# Patient Record
Sex: FEMALE | Race: WHITE | NOT HISPANIC OR LATINO
[De-identification: names, ages, dates, MRNs, and addresses within clinical notes are randomized per-mention and may not be internally consistent; named-entity substitution may affect disease eponyms.]

---
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
## 2019-07-16 ENCOUNTER — APPOINTMENT (OUTPATIENT)
Dept: OBGYN | Facility: CLINIC | Age: 19
End: 2019-07-16
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
## 2019-12-19 ENCOUNTER — APPOINTMENT (OUTPATIENT)
Dept: OBGYN | Facility: CLINIC | Age: 19
End: 2019-12-19
Payer: MEDICAID

## 2019-12-19 VITALS
WEIGHT: 121 LBS | SYSTOLIC BLOOD PRESSURE: 110 MMHG | HEIGHT: 64 IN | DIASTOLIC BLOOD PRESSURE: 73 MMHG | HEART RATE: 77 BPM | BODY MASS INDEX: 20.66 KG/M2

## 2019-12-19 DIAGNOSIS — N94.6 DYSMENORRHEA, UNSPECIFIED: ICD-10-CM

## 2019-12-19 PROCEDURE — 99395 PREV VISIT EST AGE 18-39: CPT

## 2019-12-19 RX ORDER — NORGESTIMATE AND ETHINYL ESTRADIOL 7DAYSX3 28
0.18/0.215/0.25 KIT ORAL DAILY
Qty: 3 | Refills: 0 | Status: ACTIVE | COMMUNITY
Start: 2018-04-23 | End: 1900-01-01

## 2019-12-19 NOTE — PHYSICAL EXAM
[Awake] : awake [Alert] : alert [Acute Distress] : no acute distress [Thyroid Nodule] : no thyroid nodule [LAD] : no lymphadenopathy [Goiter] : no goiter [Mass] : no breast mass [Nipple Discharge] : no nipple discharge [Axillary LAD] : no axillary lymphadenopathy [Soft] : soft [Distended] : not distended [Tender] : non tender [Oriented x3] : oriented to person, place, and time [H/Smegaly] : no hepatosplenomegaly [Depressed Mood] : not depressed [Flat Affect] : affect not flat [Labia Majora] : labia major [Labia Minora] : labia minora [Normal] : clitoris [No Bleeding] : there was no active vaginal bleeding [Pap Obtained] : a Pap smear was performed [Uterine Adnexae] : were not tender and not enlarged

## 2019-12-21 LAB
C TRACH RRNA SPEC QL NAA+PROBE: NOT DETECTED
N GONORRHOEA RRNA SPEC QL NAA+PROBE: NOT DETECTED
SOURCE AMPLIFICATION: NORMAL

## 2019-12-30 ENCOUNTER — RX RENEWAL (OUTPATIENT)
Age: 19
End: 2019-12-30

## 2020-12-17 ENCOUNTER — RX RENEWAL (OUTPATIENT)
Age: 20
End: 2020-12-17

## 2021-02-12 ENCOUNTER — APPOINTMENT (OUTPATIENT)
Dept: OBGYN | Facility: CLINIC | Age: 21
End: 2021-02-12
Payer: MEDICAID

## 2021-02-12 VITALS
SYSTOLIC BLOOD PRESSURE: 111 MMHG | WEIGHT: 119 LBS | HEIGHT: 64 IN | DIASTOLIC BLOOD PRESSURE: 75 MMHG | BODY MASS INDEX: 20.32 KG/M2

## 2021-02-12 DIAGNOSIS — Z01.419 ENCOUNTER FOR GYNECOLOGICAL EXAMINATION (GENERAL) (ROUTINE) W/OUT ABNORMAL FINDINGS: ICD-10-CM

## 2021-02-12 PROCEDURE — 99072 ADDL SUPL MATRL&STAF TM PHE: CPT

## 2021-02-12 PROCEDURE — 99395 PREV VISIT EST AGE 18-39: CPT

## 2021-02-12 RX ORDER — NORGESTIMATE AND ETHINYL ESTRADIOL 7DAYSX3 28
0.18/0.215/0.25 KIT ORAL
Qty: 84 | Refills: 3 | Status: ACTIVE | COMMUNITY
Start: 2019-12-30 | End: 1900-01-01

## 2021-02-12 NOTE — DISCUSSION/SUMMARY
[FreeTextEntry1] : Well woman visit\par \par pap done\par std cultures done\par OCP renewed-[facility], pt aware of dvt and emboli risks from ocp\par rt in 1 year\par

## 2021-02-12 NOTE — HISTORY OF PRESENT ILLNESS
[FreeTextEntry1] : 21 yo here for av. She has no gyn complaints today. She takes tri femynor and is doing well on it, no side effects.\par She denies a family h/o gyn or colon cancers.

## 2021-02-15 LAB
C TRACH RRNA SPEC QL NAA+PROBE: NOT DETECTED
N GONORRHOEA RRNA SPEC QL NAA+PROBE: NOT DETECTED
SOURCE TP AMPLIFICATION: NORMAL

## 2021-02-23 LAB — CYTOLOGY CVX/VAG DOC THIN PREP: ABNORMAL

## 2021-07-14 ENCOUNTER — EMERGENCY (EMERGENCY)
Facility: HOSPITAL | Age: 21
LOS: 1 days | Discharge: HOME | End: 2021-07-14
Attending: STUDENT IN AN ORGANIZED HEALTH CARE EDUCATION/TRAINING PROGRAM
Payer: MEDICAID

## 2021-07-14 VITALS
DIASTOLIC BLOOD PRESSURE: 77 MMHG | HEIGHT: 64 IN | HEART RATE: 85 BPM | TEMPERATURE: 98 F | WEIGHT: 119.93 LBS | RESPIRATION RATE: 18 BRPM | SYSTOLIC BLOOD PRESSURE: 118 MMHG | OXYGEN SATURATION: 98 %

## 2021-07-14 LAB
ALBUMIN SERPL ELPH-MCNC: 3.8 G/DL — SIGNIFICANT CHANGE UP (ref 3.3–5.2)
ALP SERPL-CCNC: 72 U/L — SIGNIFICANT CHANGE UP (ref 40–120)
ALT FLD-CCNC: 12 U/L — SIGNIFICANT CHANGE UP
ANION GAP SERPL CALC-SCNC: 11 MMOL/L — SIGNIFICANT CHANGE UP (ref 5–17)
AST SERPL-CCNC: 18 U/L — SIGNIFICANT CHANGE UP
BASOPHILS # BLD AUTO: 0.03 K/UL — SIGNIFICANT CHANGE UP (ref 0–0.2)
BASOPHILS NFR BLD AUTO: 0.5 % — SIGNIFICANT CHANGE UP (ref 0–2)
BILIRUB SERPL-MCNC: 0.5 MG/DL — SIGNIFICANT CHANGE UP (ref 0.4–2)
BUN SERPL-MCNC: 8.1 MG/DL — SIGNIFICANT CHANGE UP (ref 8–20)
CALCIUM SERPL-MCNC: 9.4 MG/DL — SIGNIFICANT CHANGE UP (ref 8.6–10.2)
CHLORIDE SERPL-SCNC: 104 MMOL/L — SIGNIFICANT CHANGE UP (ref 98–107)
CO2 SERPL-SCNC: 24 MMOL/L — SIGNIFICANT CHANGE UP (ref 22–29)
CREAT SERPL-MCNC: 0.64 MG/DL — SIGNIFICANT CHANGE UP (ref 0.5–1.3)
EOSINOPHIL # BLD AUTO: 0.08 K/UL — SIGNIFICANT CHANGE UP (ref 0–0.5)
EOSINOPHIL NFR BLD AUTO: 1.4 % — SIGNIFICANT CHANGE UP (ref 0–6)
GLUCOSE SERPL-MCNC: 72 MG/DL — SIGNIFICANT CHANGE UP (ref 70–99)
HCG SERPL-ACNC: <4 MIU/ML — SIGNIFICANT CHANGE UP
HCT VFR BLD CALC: 39.7 % — SIGNIFICANT CHANGE UP (ref 34.5–45)
HGB BLD-MCNC: 12.9 G/DL — SIGNIFICANT CHANGE UP (ref 11.5–15.5)
IMM GRANULOCYTES NFR BLD AUTO: 0.3 % — SIGNIFICANT CHANGE UP (ref 0–1.5)
LYMPHOCYTES # BLD AUTO: 1.89 K/UL — SIGNIFICANT CHANGE UP (ref 1–3.3)
LYMPHOCYTES # BLD AUTO: 32.1 % — SIGNIFICANT CHANGE UP (ref 13–44)
MCHC RBC-ENTMCNC: 30.1 PG — SIGNIFICANT CHANGE UP (ref 27–34)
MCHC RBC-ENTMCNC: 32.5 GM/DL — SIGNIFICANT CHANGE UP (ref 32–36)
MCV RBC AUTO: 92.5 FL — SIGNIFICANT CHANGE UP (ref 80–100)
MONOCYTES # BLD AUTO: 0.37 K/UL — SIGNIFICANT CHANGE UP (ref 0–0.9)
MONOCYTES NFR BLD AUTO: 6.3 % — SIGNIFICANT CHANGE UP (ref 2–14)
NEUTROPHILS # BLD AUTO: 3.49 K/UL — SIGNIFICANT CHANGE UP (ref 1.8–7.4)
NEUTROPHILS NFR BLD AUTO: 59.4 % — SIGNIFICANT CHANGE UP (ref 43–77)
PLATELET # BLD AUTO: 227 K/UL — SIGNIFICANT CHANGE UP (ref 150–400)
POTASSIUM SERPL-MCNC: 3.9 MMOL/L — SIGNIFICANT CHANGE UP (ref 3.5–5.3)
POTASSIUM SERPL-SCNC: 3.9 MMOL/L — SIGNIFICANT CHANGE UP (ref 3.5–5.3)
PROT SERPL-MCNC: 6.6 G/DL — SIGNIFICANT CHANGE UP (ref 6.6–8.7)
RBC # BLD: 4.29 M/UL — SIGNIFICANT CHANGE UP (ref 3.8–5.2)
RBC # FLD: 12.3 % — SIGNIFICANT CHANGE UP (ref 10.3–14.5)
SODIUM SERPL-SCNC: 139 MMOL/L — SIGNIFICANT CHANGE UP (ref 135–145)
TROPONIN T SERPL-MCNC: <0.01 NG/ML — SIGNIFICANT CHANGE UP (ref 0–0.06)
WBC # BLD: 5.88 K/UL — SIGNIFICANT CHANGE UP (ref 3.8–10.5)
WBC # FLD AUTO: 5.88 K/UL — SIGNIFICANT CHANGE UP (ref 3.8–10.5)

## 2021-07-14 PROCEDURE — 84702 CHORIONIC GONADOTROPIN TEST: CPT

## 2021-07-14 PROCEDURE — 99283 EMERGENCY DEPT VISIT LOW MDM: CPT | Mod: 25

## 2021-07-14 PROCEDURE — 93010 ELECTROCARDIOGRAM REPORT: CPT

## 2021-07-14 PROCEDURE — 36415 COLL VENOUS BLD VENIPUNCTURE: CPT

## 2021-07-14 PROCEDURE — 85025 COMPLETE CBC W/AUTO DIFF WBC: CPT

## 2021-07-14 PROCEDURE — 84484 ASSAY OF TROPONIN QUANT: CPT

## 2021-07-14 PROCEDURE — 93005 ELECTROCARDIOGRAM TRACING: CPT

## 2021-07-14 PROCEDURE — 71046 X-RAY EXAM CHEST 2 VIEWS: CPT

## 2021-07-14 PROCEDURE — 71046 X-RAY EXAM CHEST 2 VIEWS: CPT | Mod: 26

## 2021-07-14 PROCEDURE — 99285 EMERGENCY DEPT VISIT HI MDM: CPT

## 2021-07-14 PROCEDURE — 80053 COMPREHEN METABOLIC PANEL: CPT

## 2021-07-14 RX ORDER — LIDOCAINE 4 G/100G
1 CREAM TOPICAL ONCE
Refills: 0 | Status: COMPLETED | OUTPATIENT
Start: 2021-07-14 | End: 2021-07-14

## 2021-07-14 RX ORDER — ACETAMINOPHEN 500 MG
650 TABLET ORAL ONCE
Refills: 0 | Status: COMPLETED | OUTPATIENT
Start: 2021-07-14 | End: 2021-07-14

## 2021-07-14 RX ADMIN — Medication 650 MILLIGRAM(S): at 10:55

## 2021-07-14 RX ADMIN — Medication 650 MILLIGRAM(S): at 10:12

## 2021-07-14 RX ADMIN — LIDOCAINE 1 PATCH: 4 CREAM TOPICAL at 10:12

## 2021-07-14 NOTE — ED STATDOCS - NSFOLLOWUPINSTRUCTIONS_ED_ALL_ED_FT
please call and follow up with primary care within 24 - 48 hours and bring the result     Chest Pain    Chest pain can be caused by many different conditions which may or may not be dangerous. Causes include heartburn, lung infections, heart attack, blood clot in lungs, skin infections, strain or damage to muscle, cartilage, or bones, etc. In addition to a history and physical examination, an electrocardiogram (ECG) or other lab tests may have been performed to determine the cause of your chest pain. Follow up with your primary care provider or with a cardiologist as instructed.     SEEK IMMEDIATE MEDICAL CARE IF YOU HAVE ANY OF THE FOLLOWING SYMPTOMS: worsening chest pain, coughing up blood, unexplained back/neck/jaw pain, severe abdominal pain, dizziness or lightheadedness, fainting, shortness of breath, sweaty or clammy skin, vomiting, or racing heart beat. These symptoms may represent a serious problem that is an emergency. Do not wait to see if the symptoms will go away. Get medical help right away. Call 911 and do not drive yourself to the hospital.

## 2021-07-14 NOTE — ED STATDOCS - PROGRESS NOTE DETAILS
pt is seen by Dr york initially agreed with hx , PE and plan   seen the pt at the bed side  c.o right side of the chest wall pain get worse by take deep breathing   explained finding . she has pcp will f.u   pt state she is prescribed naproxen 500mg for previously recommended to cont

## 2021-07-14 NOTE — ED STATDOCS - ATTENDING CONTRIBUTION TO CARE
I, Lakia Coffey, performed a face to face bedside interview with this patient regarding history of present illness, review of symptoms and relevant past medical, social and family history.  I completed an independent physical examination. Medical decision making, follow-up on ordered tests (ie labs, radiologic studies) and re-evaluation of the patient's status has been communicated to the ACP.  Disposition of the patient will be based on test outcome and response to ED interventions.

## 2021-07-14 NOTE — ED STATDOCS - OBJECTIVE STATEMENT
21 y/o female no significant PMHx c/o 3 days of left side chest wall pain worse on deep breath. Pt denies trauma. Pt went to [facility] MD had x-ray that was normal per pt and a negative d-dimer. No hx of DVT or PE. Recent covid-19 vax with Moderna in May

## 2021-07-14 NOTE — ED ADULT NURSE NOTE - OBJECTIVE STATEMENT
Patient c/o left ribs pain with sudden onset on monday afternoon, was seen in urgent care with results negative.

## 2021-07-14 NOTE — ED STATDOCS - PATIENT PORTAL LINK FT
You can access the FollowMyHealth Patient Portal offered by [facility] by registering at the following website: [email or web address]. By joining [employer]’s FollowMyHealth portal, you will also be able to view your health information using other applications (apps) compatible with our system.

## 2021-07-14 NOTE — ED ADULT TRIAGE NOTE - CHIEF COMPLAINT QUOTE
Pt c/o pain to left ribcage x 2 days, had xray and blood work done Monday night which was negative, prescribed naproxen, taken w/out relief, denies injury/trauma to area

## 2021-07-14 NOTE — ED STATDOCS - CLINICAL SUMMARY MEDICAL DECISION MAKING FREE TEXT BOX
pt reports neg d-dimer outpatient. Will get EKG, chest x-ray low risk for ACS. pain control, reassess

## 2022-02-01 ENCOUNTER — APPOINTMENT (OUTPATIENT)
Dept: ORTHOPEDIC SURGERY | Facility: CLINIC | Age: 22
End: 2022-02-01
Payer: MEDICAID

## 2022-02-01 VITALS
HEART RATE: 67 BPM | WEIGHT: 113 LBS | DIASTOLIC BLOOD PRESSURE: 74 MMHG | SYSTOLIC BLOOD PRESSURE: 117 MMHG | TEMPERATURE: 97.1 F | BODY MASS INDEX: 19.29 KG/M2 | HEIGHT: 64 IN

## 2022-02-01 PROCEDURE — 99203 OFFICE O/P NEW LOW 30 MIN: CPT

## 2022-02-01 PROCEDURE — 73110 X-RAY EXAM OF WRIST: CPT | Mod: LT

## 2022-02-01 RX ORDER — MELOXICAM 15 MG/1
15 TABLET ORAL
Qty: 30 | Refills: 2 | Status: ACTIVE | COMMUNITY
Start: 2022-02-01 | End: 1900-01-01

## 2022-02-01 NOTE — ASSESSMENT
[FreeTextEntry1] : Patient with left wrist pain consistent with ECU tendinitis. The nature of this condition was described at length with the patient she verbalized understanding. I recommend an extended period of rest and immobilization with activities. A note was given to the patient to bring with her to work in order to be able to rest and not do any lifting. Gentle wrist range of motion/stretching exercises were demonstrated to the patient today. I recommend twice daily exercises to prevent stiffness. Prescription for meloxicam was sent for the patient as well. The patient will followup in 2 weeks for reevaluation if she is not seeing improvement. Otherwise she will followup as needed. The patient is in agreement with this plan and appreciative of her care.

## 2022-02-01 NOTE — PHYSICAL EXAM
[Normal Finger/nose] : finger to nose coordination [Normal] : no peripheral adenopathy appreciated [de-identified] : Left Wrist Exam\par \par Skin: Intact with no erythema, no ecchymosis\par Exam: No foveal TTP, no SL TTP, no TTP of snuffbox, distal radius or distal ulna. Negative Thakkar's test, no DRUJ instability, no ECU subluxation but positive tenderness on palpation. Negative Grind Test. Negative Finkelstein's Test.\par ROM: 60 degrees of flexion, 60 degrees of extension. Full pronation and supination. Ulnar sided pain, specifically with supination.\par Neuro: Sensation is grossly intact without any deficits.  strength is 5/5. Negative Tinel's at the Wrist and Elbow. Negative Phalen's at the wrist.\par Vasc: Distal Pulses 2+ and capillary refill is brisk.\par  [de-identified] : VINICIUSR [de-identified] : 3 xray views of the left wrist were obtained.\par \par No fractures or dislocations are noted.

## 2022-02-01 NOTE — HISTORY OF PRESENT ILLNESS
[FreeTextEntry1] : 02/01/2022: Patient is a 21 year-old, right-hand-dominant female who presents to the office today for initial evaluation of left wrist pain. In the beginning of December, she was holding a heavy object with her wrist in full extension. She felt ulnar sided wrist pain because she felt that her wrist gave out on her. This sensation was associated with paresthesias that are transient and in the ulnar nerve distribution. Now she has sharp pain with lifting and dull and achy pain with rest. The pain is mostly located on the ulnar aspect of the wrist, just proximal to the ulnar fovea. She has tried Tylenol, Advil, and Motrin with little improvement in her symptoms. 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 She does note that ice does help a little bit. She has tried PRN bracing with few positive results.

## 2022-02-15 ENCOUNTER — APPOINTMENT (OUTPATIENT)
Dept: ORTHOPEDIC SURGERY | Facility: CLINIC | Age: 22
End: 2022-02-15
Payer: MEDICAID

## 2022-02-15 VITALS
BODY MASS INDEX: 19.29 KG/M2 | HEART RATE: 71 BPM | SYSTOLIC BLOOD PRESSURE: 108 MMHG | DIASTOLIC BLOOD PRESSURE: 66 MMHG | WEIGHT: 113 LBS | HEIGHT: 64 IN

## 2022-02-15 PROCEDURE — 99214 OFFICE O/P EST MOD 30 MIN: CPT | Mod: 25

## 2022-02-15 PROCEDURE — 20605 DRAIN/INJ JOINT/BURSA W/O US: CPT | Mod: LT

## 2022-02-16 ENCOUNTER — EMERGENCY (EMERGENCY)
Facility: HOSPITAL | Age: 22
LOS: 1 days | Discharge: HOME | End: 2022-02-16
Attending: EMERGENCY MEDICINE
Payer: COMMERCIAL

## 2022-02-16 ENCOUNTER — NON-APPOINTMENT (OUTPATIENT)
Age: 22
End: 2022-02-16

## 2022-02-16 VITALS
HEIGHT: 64 IN | DIASTOLIC BLOOD PRESSURE: 83 MMHG | OXYGEN SATURATION: 98 % | HEART RATE: 63 BPM | RESPIRATION RATE: 20 BRPM | SYSTOLIC BLOOD PRESSURE: 122 MMHG | TEMPERATURE: 98 F | WEIGHT: 113.1 LBS

## 2022-02-16 PROCEDURE — 73130 X-RAY EXAM OF HAND: CPT | Mod: 26,LT

## 2022-02-16 PROCEDURE — 73130 X-RAY EXAM OF HAND: CPT

## 2022-02-16 PROCEDURE — 99284 EMERGENCY DEPT VISIT MOD MDM: CPT | Mod: 25

## 2022-02-16 PROCEDURE — 73110 X-RAY EXAM OF WRIST: CPT

## 2022-02-16 PROCEDURE — 99283 EMERGENCY DEPT VISIT LOW MDM: CPT

## 2022-02-16 PROCEDURE — 73110 X-RAY EXAM OF WRIST: CPT | Mod: 26,LT

## 2022-02-16 NOTE — ED STATDOCS - NSFOLLOWUPINSTRUCTIONS_ED_ALL_ED_FT
Ulnar Nerve Contusion       The ulnar nerve extends from the shoulder to the small finger (pinkie) of the hand. This nerve provides feeling (sensation) to the pinkie and half of the ring finger. It also controls many hand and forearm muscles that let you  objects. An ulnar nerve contusion is a bruise of the ulnar nerve at a point close to the elbow.    An ulnar nerve contusion can cause a loss of feeling or movement in your hand. It can also affect your ability to use the muscles that allow you to  objects.      What are the causes?    This condition may be caused by:  •A hit to the elbow.      •Falling on your elbow.      •Shoving your elbow against a hard surface.        What increases the risk?    This condition is more likely to develop in people who:  •Play contact sports, like football.      •Have a disorder that increases the risk of bleeding.      •Take blood thinning medicine, such as warfarin.        What are the signs or symptoms?    Symptoms of this condition include:  •Tingling or numbness in the hand, especially in the pinkie or ring finger.      •Weakness while moving the hand from side to side in a waving motion or while moving your fingers together.      •Abnormal claw-like hand position or deformity.        How is this diagnosed?    This condition may be diagnosed based on your symptoms, your medical history, and a physical exam. You may have tests, such as:  •An X-ray to check for broken bones.      •An electromyogram (EMG) to see how well your nerves are working.      •A nerve conduction study (NCS) to see how electrical signals pass through your nerves.      •An MRI to find the source of any nerve problems.        How is this treated?    This condition usually heals on its own within 6 weeks. Treatment can help to reduce symptoms and may include:  •Wearing a brace or splint at night to keep your elbow straight while you sleep.      •Taking NSAIDs, such as ibuprofen, to reduce pain and swelling around the nerve.      •Working with a physical therapist to ease stiffness in your elbow and wrist.        Follow these instructions at home:    If you have a brace or splint:     •Wear it as told by your health care provider. Remove it only as told by your health care provider.      •Loosen it if your fingers tingle, become numb, or turn cold and blue.      •Keep it clean.    •If it is not waterproof:  •Do not let it get wet.      •Cover it with a watertight covering when you take a bath or shower.          Managing pain, stiffness, and swelling    •If directed, put ice on the injured area.  •If you have a removable brace or splint, remove it as told by your health care provider.      •Put ice in a plastic bag.      •Place a towel between your skin and the bag.      •Leave the ice on for 20 minutes, 2–3 times a day.        •Move your fingers often to reduce stiffness and swelling.      •Raise (elevate) the injured area above the level of your heart while you are sitting or lying down.      Activity     •Return to your normal activities as told by your health care provider. Ask your health care provider what activities are safe for you.      •Avoid activities that take a lot of effort (strenuous) for as long as told by your health care provider.      •Do exercises as told by your health care provider.      General instructions     • Do not use the injured limb to support your body weight until your health care provider says that you can.      • Do not use any products that contain nicotine or tobacco, such as cigarettes, e-cigarettes, and chewing tobacco. If you need help quitting, ask your health care provider.      •Take over-the-counter and prescription medicines only as told by your health care provider.      •Keep all follow-up visits as told by your health care provider. This is important.        How is this prevented?    •Be safe and responsible while being active to avoid falls.      •Use protective equipment during sports activities, such as elbow pads.        Contact a health care provider if your:    •Pain does not improve or it gets worse.      •Swelling does not improve or it gets worse.      • becomes weaker or you start to drop things easily.        Get help right away if you:    •Have severe pain.      •Have severe swelling.      •Cannot move your wrist, hand, or elbow.      •Cannot feel parts of your hand, wrist, or arm.        Summary    •An ulnar nerve contusion is a bruise of the ulnar nerve at a point close to the elbow. The ulnar nerve extends from the shoulder to the small finger (pinkie) of the hand.      •This injury may be caused by a hit or a fall on your elbow. You are more likely to get this injury if you play contact sports.      •If you have a brace or splint, wear it and remove it as told by your health care provider. Keep it clean and dry.      •To help with the symptoms of pain, stiffness, and swelling, put ice on the injured area or take NSAIDs as directed by your health care provider.      •Get help right away if you have severe pain or severe swelling, or if you cannot feel your hand, wrist, or arm.      This information is not intended to replace advice given to you by your health care provider. Make sure you discuss any questions you have with your health care provider.

## 2022-02-16 NOTE — ED STATDOCS - PATIENT PORTAL LINK FT
You can access the FollowMyHealth Patient Portal offered by [facility] by registering at the following website: [email or web address]. By joining [employer]’s FollowMyHealth portal, you will also be able to view your health information using other applications (apps) compatible with our system.

## 2022-02-16 NOTE — ED STATDOCS - CLINICAL SUMMARY MEDICAL DECISION MAKING FREE TEXT BOX
Most likely ulnar nerve injury as noted decreased sensation and motor function to 4th and 5th digit. Will get left wrist XR and consult orthopedics. Most likely ulnar nerve injury vs residual ulnar nerve block; 2+ pulse to ulnar and radial side; cap refill <2sec;  as noted decreased sensation and motor function to 4th and 5th digit. Will get left wrist XR and consult orthopedics.

## 2022-02-16 NOTE — ED STATDOCS - PHYSICAL EXAMINATION
Gen: No acute distress, non toxic  HEENT: Mucous membranes moist, pink conjunctivae, EOMI  CV: RRR, nl s1/s2.  Resp: CTAB, normal rate and effort  GI: Abdomen soft, NT, ND. No rebound, no guarding  : No CVAT  Neuro: A&O x 3, moving all 4 extremities  MSK: No spine or joint tenderness to palpation. 2+ radial and 2+ ulna pulses  Skin: No rashes. intact and perfused. Gen: No acute distress, non toxic  HEENT: Mucous membranes moist, pink conjunctivae, EOMI  CV: RRR, nl s1/s2.  Resp: CTAB, normal rate and effort  GI: Abdomen soft, NT, ND. No rebound, no guarding  : No CVAT  Neuro: A&O x 3, moving all 4 extremities  MSK: No spine or joint tenderness to palpation. 2+ radial and 2+ ulna pulses ttp to wrist on ulnar side with echymosis; decreased sensaiton to 4th and 5th digit able to move slightly no pain to digits cap refill <2sec  Skin: No rashes. intact and perfused.

## 2022-02-16 NOTE — ED STATDOCS - PROGRESS NOTE DETAILS
Jorge Plunkett PA-C: Pt discussed at length with attending HPI/ROS/PE confirmed, Pt seen resting comfortable in no acute distress in chair.   PLAN: PT with stable VS, no acute distress, non toxic appearing, tolerating PO in the ED, Pt with good pulse, sensation intact, good cap refill, pain controlled, Pt with no s/s of infection, Pt requesting Dc prior to completion of formal recs,  joint decision making module used to creat plan of care, lengthy discussion had between PA and Pt to creat plan, Pt to dc home with continued Velcro splint from outside facility, CM contacted for emergent follow up to the office OTC pain control overnight, educated about when to return to the ED if needed. PT verbalizes that he understands all instructions and results. Pt informed that ED is open and available 24/7 365 days a yr, encouraged to return to the ED if they have any change in condition, or feel the need for revaluation.

## 2022-02-16 NOTE — ED STATDOCS - OBJECTIVE STATEMENT
22 Y/O female w/ no PMHx. presents to ED s/p urgent care visit and was referred to ED for further eval s/p cortisone injection w/ lidocaine in left wrist around 5:40 last night for persistent pain. PT states orthopedic PA Oscar Reza believes she tore ligament in wrist but has been unable to get MRI due to insurance issues. PT is now c/o increased pain with inability to move wrist or fingers as well as decreased sensation to the left pinky and ring finger. Denies f/c/n/v/cp/sob/palpitations/cough/rash/headache/dizziness/abd.pain/d/c/dysuria/hematuria, past history, daily meds, allergies to meds, chance of pregnancy. 20 Y/O female w/ no PMHx. presents to ED s/p urgent care visit and was referred to ED for further eval s/p cortisone injection w/ lidocaine in left wrist around 5:40pm last night for persistent pain. PT states orthopedic PA Oscar Reza believes she tore ligament in wrist but has been unable to get MRI due to insurance issues. PT is now c/o increased pain with inability to move wrist or fingers as well as decreased sensation to the left pinky and ring finger sp injection yesterday. Denies f/c/n/v/cp/sob/palpitations/cough/rash/headache/dizziness/abd.pain/d/c/dysuria/hematuria, past history, daily meds, allergies to meds, chance of pregnancy.

## 2022-02-16 NOTE — ED STATDOCS - ATTENDING CONTRIBUTION TO CARE
I, Denisse Pan, performed the initial face to face bedside interview with this patient regarding history of present illness, review of symptoms and relevant past medical, social and family history.  I completed an independent physical examination.  I was the initial provider who evaluated this patient. I have signed out the follow up of any pending tests (i.e. labs, radiological studies) to the ACP.  I have communicated the patient’s plan of care and disposition with the ACP.

## 2022-02-16 NOTE — ED STATDOCS - ADDITIONAL NOTES AND INSTRUCTIONS:
PT was evaluated At [facility] ED and was found to have a condition that warranted time of to rest and heal from WORK/SCHOOL.   Jorge Plunkett PA-C

## 2022-02-16 NOTE — ED STATDOCS - NSFOLLOWUPCLINICS_GEN_ALL_ED_FT
[facility]  Orthopedics  [address] [place] [id number]  Phone: [phone]  Fax:

## 2022-02-16 NOTE — CONSULT NOTE ADULT - SUBJECTIVE AND OBJECTIVE BOX
Pt Name: JAEL DUNAWAY    MRN: [id number]      Patient is a 21y Female presenting to the emergency department with a chief complaint of left hand 5 th digit numbness following cortisone injection 1 day ago.  Patient states in December she was moving out of her apt and was lifting heavy boxes when she began to have wrist pain.    she was seen several weeks ago in orthopedic office and was given a velcro wrist splint and anti inflammatories.  She was to remain non weight bearing.  She states while at work yesterday she lifted many boxes causing extreme pain in her hand and wrist.  She was seen again in the office where she received a cortisone injection l at ulnar aspect of wrist.  Today she presents with numbness to 5 th digit.   Patient states that then she has severe pain she will sometimes get numbness to 5 th digit.  Denies any other injuries       REVIEW OF SYSTEMS    General: Alert, responsive, in NAD    Skin/Breast: No rashes, no pruritis   	  Ophthalmologic: No visual changes. No redness.   	  ENMT:	No discharge. No swelling.    Respiratory and Thorax: No difficulty breathing. No cough.  	   Cardiovascular:	No chest pain. No palpitations.    Gastrointestinal:	 No abdominal pain. No diarrhea.     Genitourinary: No dysuria. No bleeding.    Musculoskeletal: SEE HPI.    Neurological: No sensory or motor changes.     Psychiatric: No anxiety or depression.    Hematology/Lymphatics: No swelling.    Endocrine: No Hx of diabetes.    ROS is otherwise negative.    PAST MEDICAL & SURGICAL HISTORY:  PAST MEDICAL & SURGICAL HISTORY:  No pertinent past medical history    No significant past surgical history        Allergies: penicillins (Rash)      Medications:     FAMILY HISTORY:  : non-contributory    Social History:     Ambulation: Walking independently    Vital Signs Last 24 Hrs  T(C): 36.9 (16 Feb 2022 13:13), Max: 36.9 (16 Feb 2022 13:13)  T(F): 98.4 (16 Feb 2022 13:13), Max: 98.4 (16 Feb 2022 13:13)  HR: 63 (16 Feb 2022 13:13) (63 - 63)  BP: 122/83 (16 Feb 2022 13:13) (122/83 - 122/83)  BP(mean): --  RR: 20 (16 Feb 2022 13:13) (20 - 20)  SpO2: 98% (16 Feb 2022 13:13) (98% - 98%)    Daily Height in cm: 162.56 (16 Feb 2022 13:13)    Daily       PHYSICAL EXAM:      Appearance: Alert, responsive, in no acute distress.    Neurological: Sensation is grossly intact to light touch  to left hand digits 1,2,3. DECREASED SENSATION TO ULNAR ASPECT OF 4 TH DIGIT, LOSS OF SENSATION TO 5 TH DIGIT.   Skin: no rash on visible skin. Skin is clean, dry and intact. No bleeding. No abrasions. No ulcerations. small area of ecchymosis noted to ulnar aspect of left wrist following injection.     Vascular: 2+ distal pulses. Cap refill < 2 sec. No signs of venous insufficiency or stasis. No extremity ulcerations. No cyanosis.    Musculoskeletal:         Left Upper Extremity:  Sensation is grossly intact to light touch  to left hand digits 1,2,3. DECREASED SENSATION TO ULNAR ASPECT OF 4 TH DIGIT, LOSS OF SENSATION TO 5 TH DIGIT.   Skin: no rash on visible skin. Skin is clean, dry and intact. No bleeding. No abrasions. No ulcerations. small area of ecchymosis noted to ulnar aspect of left wrist following injection.   Limited rom of fingers secondary to pain.    1st digit motor and sensory intact.   Patient is unable to make a fist   pain with elbow and wrist flexion  brisk capillary refill  skin warm well perfused      Imaging Studies:  xray of left hand / no fracture/ dislocation  pending official report     A/P:  Pt is a  21y Female with ECU tendonitis s/p lidocaine & dexamethasone injection 1 day ago presents with numbness of 5 th digit.        PLAN:   * velcro wrist splint  pain control   Discussed with Dr Duong  Awaiting final recommendations

## 2022-02-16 NOTE — ED ADULT TRIAGE NOTE - CHIEF COMPLAINT QUOTE
Pt  sent from [facility] for inability to move her  l hand  4th and 5 th digits after receiving cortisone injection yesterday

## 2022-02-17 ENCOUNTER — APPOINTMENT (OUTPATIENT)
Dept: ORTHOPEDIC SURGERY | Facility: CLINIC | Age: 22
End: 2022-02-17
Payer: MEDICAID

## 2022-02-17 ENCOUNTER — NON-APPOINTMENT (OUTPATIENT)
Age: 22
End: 2022-02-17

## 2022-02-17 ENCOUNTER — APPOINTMENT (OUTPATIENT)
Dept: ORTHOPEDIC SURGERY | Facility: CLINIC | Age: 22
End: 2022-02-17

## 2022-02-17 VITALS
BODY MASS INDEX: 19.29 KG/M2 | HEART RATE: 79 BPM | HEIGHT: 64 IN | SYSTOLIC BLOOD PRESSURE: 106 MMHG | WEIGHT: 113 LBS | DIASTOLIC BLOOD PRESSURE: 67 MMHG

## 2022-02-17 DIAGNOSIS — M77.8 OTHER ENTHESOPATHIES, NOT ELSEWHERE CLASSIFIED: ICD-10-CM

## 2022-02-17 PROCEDURE — 99214 OFFICE O/P EST MOD 30 MIN: CPT

## 2022-02-17 RX ORDER — DICLOFENAC SODIUM 1% 10 MG/G
1 GEL TOPICAL
Qty: 1 | Refills: 0 | Status: ACTIVE | COMMUNITY
Start: 2022-02-17 | End: 1900-01-01

## 2022-02-17 NOTE — ASSESSMENT
[FreeTextEntry1] : Patient with continued ulnar sided left wrist pain that was unrelieved with rest and anti-inflammatories. I recommend continuation of conservative measures she has been trying including anti-inflammatories and gentle ROM exercises when not in the wrist immobilizer. Today a cortisone injection was offered to the patient to the ulnar aspect of the wrist. She was in agreement and tolerated the injection well. Should she continue to have symptoms in the next 2-4 weeks she will call to advise me on how she feels and I will refer her for an MRI and subsequent followup with one of my physician colleagues. Should her symptoms improve, she will followup as needed. The patient is in agreement with this plan and appreciative of her care.

## 2022-02-17 NOTE — PROCEDURE
[FreeTextEntry1] : Injection: Wrist Left\par \par There was a discussion with the patient regarding this procedure and all questions/concerns were answered. All of the risks, benefits and alternatives were discussed at length. These include but are not limited to bleeding, infection, and allergic reaction. The injection site at the left ulnar fovea was marked with the syringe cap. Chlorhexidine was used to clean, sterilize and prep the skin at the injection site. Ethyl chloride spray was used as a topical anesthetic. A 22G needle was used to inject 1cc of 1% lidocaine and 1cc of 4mg/mL dexamethasone into the wrist. A sterile bandage was applied to the site of the injection. The patient tolerated the procedure well with no neurologic sequellae and there were no complications.\par

## 2022-02-17 NOTE — HISTORY OF PRESENT ILLNESS
[FreeTextEntry1] : 02/01/2022: Patient is a 21 year-old, right-hand-dominant female who presents to the office today for initial evaluation of left wrist pain. In the beginning of December, she was holding a heavy object with her wrist in full extension. She felt ulnar sided wrist pain because she felt that her wrist gave out on her. This sensation was associated with paresthesias that are transient and in the ulnar nerve distribution. Now she has sharp pain with lifting and dull and achy pain with rest. The pain is mostly located on the ulnar aspect of the wrist, just proximal to the ulnar fovea. She has tried Tylenol, Advil, and Motrin with little improvement in her symptoms. 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 She does note that ice does help a little bit. She has tried PRN bracing with few positive results. \par \par 02/15/2022: Patient returns to the office with continued left wrist pain. The pain continues to be noted on the ulnar aspect of the wrist. She has seen no improvement with 2 weeks of conservative treatment with oral anti-inflammatory medications, wrist immobilization and rest. She notes that yesterday, when at work, she lifted a heavy object and her pain worsened. The patient also continues to complain of intermittent paresthesias in the ulnar nerve distribution of her left wrist. These paresthesias sometimes radiate into the ulnar aspect of the mid forearm. She presents today for re-evaluation and further treatment options.

## 2022-02-17 NOTE — PHYSICAL EXAM
[Normal Finger/nose] : finger to nose coordination [Normal] : no peripheral adenopathy appreciated [de-identified] : Left Wrist Exam\par \par Skin: Intact with no erythema, no ecchymosis\par Exam: Today, the patient has more ulnar sided foveal TTP, no SL TTP, no TTP of snuffbox, distal radius or distal ulna. Negative Thakkar's test, no DRUJ instability, no ECU subluxation but positive tenderness on palpation. Negative Grind Test. Negative Finkelstein's Test. Overall, there is more diffuse ulnar sided wrist pain on exam today than there was at her last visit on 02/01/2022.\par ROM: 60 degrees of flexion, 60 degrees of extension. Full pronation and supination. Ulnar sided pain, specifically with supination.\par Neuro: Sensation is grossly intact without any deficits.  strength is 5/5. There was still a negative Tinel's at the Wrist over the median and ulnar nerves, as well as at the elbow. Negative Phalen's at the wrist.\par Vasc: Distal Pulses 2+ and capillary refill is brisk.\par  [de-identified] : VINICIUSR [de-identified] : No xray images were obtained at this visit.\par

## 2022-02-18 ENCOUNTER — APPOINTMENT (OUTPATIENT)
Dept: ORTHOPEDIC SURGERY | Facility: CLINIC | Age: 22
End: 2022-02-18
Payer: MEDICAID

## 2022-02-18 ENCOUNTER — NON-APPOINTMENT (OUTPATIENT)
Age: 22
End: 2022-02-18

## 2022-02-18 PROCEDURE — 99442: CPT

## 2022-02-19 ENCOUNTER — APPOINTMENT (OUTPATIENT)
Dept: ORTHOPEDIC SURGERY | Facility: CLINIC | Age: 22
End: 2022-02-19

## 2022-02-20 ENCOUNTER — APPOINTMENT (OUTPATIENT)
Dept: ORTHOPEDIC SURGERY | Facility: CLINIC | Age: 22
End: 2022-02-20
Payer: MEDICAID

## 2022-02-20 DIAGNOSIS — M77.8 OTHER ENTHESOPATHIES, NOT ELSEWHERE CLASSIFIED: ICD-10-CM

## 2022-02-20 PROCEDURE — 99442: CPT

## 2022-02-28 ENCOUNTER — APPOINTMENT (OUTPATIENT)
Dept: NEUROLOGY | Facility: CLINIC | Age: 22
End: 2022-02-28

## 2022-03-01 ENCOUNTER — APPOINTMENT (OUTPATIENT)
Dept: MRI IMAGING | Facility: CLINIC | Age: 22
End: 2022-03-01
Payer: MEDICAID

## 2022-03-01 ENCOUNTER — OUTPATIENT (OUTPATIENT)
Dept: OUTPATIENT SERVICES | Facility: HOSPITAL | Age: 22
LOS: 1 days | End: 2022-03-01
Payer: MEDICAID

## 2022-03-01 DIAGNOSIS — Z00.8 ENCOUNTER FOR OTHER GENERAL EXAMINATION: ICD-10-CM

## 2022-03-01 DIAGNOSIS — M25.532 PAIN IN LEFT WRIST: ICD-10-CM

## 2022-03-01 PROCEDURE — 73221 MRI JOINT UPR EXTREM W/O DYE: CPT

## 2022-03-02 PROCEDURE — 73221 MRI JOINT UPR EXTREM W/O DYE: CPT | Mod: 26,LT

## 2022-03-03 ENCOUNTER — APPOINTMENT (OUTPATIENT)
Dept: ORTHOPEDIC SURGERY | Facility: CLINIC | Age: 22
End: 2022-03-03
Payer: MEDICAID

## 2022-03-03 DIAGNOSIS — R20.2 PARESTHESIA OF SKIN: ICD-10-CM

## 2022-03-03 DIAGNOSIS — M25.532 PAIN IN LEFT WRIST: ICD-10-CM

## 2022-03-03 PROCEDURE — 99442: CPT

## 2022-03-06 PROBLEM — R20.2 LEFT HAND PARESTHESIA: Status: ACTIVE | Noted: 2022-02-18

## 2022-03-06 PROBLEM — M25.532 LEFT WRIST PAIN: Status: ACTIVE | Noted: 2022-01-28

## 2022-03-07 ENCOUNTER — NON-APPOINTMENT (OUTPATIENT)
Age: 22
End: 2022-03-07

## 2022-03-10 ENCOUNTER — APPOINTMENT (OUTPATIENT)
Dept: NEUROLOGY | Facility: CLINIC | Age: 22
End: 2022-03-10
Payer: MEDICAID

## 2022-03-10 VITALS
TEMPERATURE: 97.5 F | HEIGHT: 64 IN | SYSTOLIC BLOOD PRESSURE: 111 MMHG | BODY MASS INDEX: 19.29 KG/M2 | WEIGHT: 113 LBS | OXYGEN SATURATION: 99 % | DIASTOLIC BLOOD PRESSURE: 76 MMHG | HEART RATE: 65 BPM

## 2022-03-10 DIAGNOSIS — Z80.8 FAMILY HISTORY OF MALIGNANT NEOPLASM OF OTHER ORGANS OR SYSTEMS: ICD-10-CM

## 2022-03-10 DIAGNOSIS — Z83.438 FAMILY HISTORY OF OTHER DISORDER OF LIPOPROTEIN METABOLISM AND OTHER LIPIDEMIA: ICD-10-CM

## 2022-03-10 PROCEDURE — 99204 OFFICE O/P NEW MOD 45 MIN: CPT

## 2022-03-10 RX ORDER — GABAPENTIN 100 MG/1
100 CAPSULE ORAL
Qty: 90 | Refills: 1 | Status: ACTIVE | COMMUNITY
Start: 2022-03-10 | End: 1900-01-01

## 2022-03-10 NOTE — PHYSICAL EXAM
[FreeTextEntry1] : Mental Status: AAO x3, no dysarthria, no aphasia, communicating appropriately\par CN: PERRL, EOMI, VFF, V1-V3 sensation intact, hearing grossly intact, no facial asymmetry, tongue midline\par Motor: \par R deltoids 5/5\par R  biceps 5/5\par R triceps 5/5\par R finger abduction 5/5\par R thumb abduction 5/5\par \par L deltoids 5/5\par L biceps 5/5\par L triceps 5/5\par L ADM 1/5\par fourth and fifth digit finger extension 1/5\par L thumb abduction 4/5\par \par RLE 5.5\par LLE 5/5\par \par \par Sensory: decreased to pinprick in ulnar distribution of left hand\par Reflexes: 2+ throughout, toes equivocal bilaterally\par Coordination: no dysmetria on FTN\par Gait: steady\par  [General Appearance - Alert] : alert [Sclera] : the sclera and conjunctiva were normal [Outer Ear] : the ears and nose were normal in appearance [] : no respiratory distress [Skin Color & Pigmentation] : normal skin color and pigmentation

## 2022-03-10 NOTE — HISTORY OF PRESENT ILLNESS
[FreeTextEntry1] : 20 yo woman with medical conditions as outlined below presents for further evaluation of left hand weakness. She states that in December, she was lifting a bed frame and started to experience severe left wrist pain. She denies hand weakness at that time. She states on February 15, she got a cortisone injection in the wrist and the following morning, she was unable to use her left hand. She states she could not extend any of her fingers. She states with time, movement of the first three digits of her left hand improved, but she still has difficulty extending the 4th and fifth digits of the left hand. She states the paresthesias in her left hand has also improved. She denies radicular neck pain. She had an MRI of the wrist which showed mild left tenosynovitis of the extensor carpi radialis longus tendon, but was otherwise unremarkable.

## 2022-03-10 NOTE — ASSESSMENT
[FreeTextEntry1] : 20 yo woman with likely left ulnar neuropathy\par \par EMG/NCS tomorrow of left upper extremity\par \par As patient has severe left wrist pain will start gabapentin as outlined below\par She was made aware of side effects of this medication and was advised to notify me if she experiences any\par She was advised to notify me for worsening symptoms.\par

## 2022-03-11 ENCOUNTER — APPOINTMENT (OUTPATIENT)
Dept: NEUROLOGY | Facility: CLINIC | Age: 22
End: 2022-03-11
Payer: MEDICAID

## 2022-03-11 DIAGNOSIS — R29.898 OTHER SYMPTOMS AND SIGNS INVOLVING THE MUSCULOSKELETAL SYSTEM: ICD-10-CM

## 2022-03-11 PROCEDURE — 95886 MUSC TEST DONE W/N TEST COMP: CPT

## 2022-03-11 PROCEDURE — 95912 NRV CNDJ TEST 11-12 STUDIES: CPT

## 2022-03-21 ENCOUNTER — APPOINTMENT (OUTPATIENT)
Dept: OBGYN | Facility: CLINIC | Age: 22
End: 2022-03-21

## 2022-03-21 DIAGNOSIS — Z11.3 ENCOUNTER FOR SCREENING FOR INFECTIONS WITH A PREDOMINANTLY SEXUAL MODE OF TRANSMISSION: ICD-10-CM

## 2022-03-21 NOTE — PROCEDURE
[FreeTextEntry3] : EMG/NCS was performed. Please see scanned EMG results for further details.\par Recommend MRI L forearm to help evaluate for ulnar neuropathy proximal to wrist\par Recommend MRI C spine to r/o C8 radiculopathy on the left

## 2022-03-23 ENCOUNTER — OUTPATIENT (OUTPATIENT)
Dept: OUTPATIENT SERVICES | Facility: HOSPITAL | Age: 22
LOS: 1 days | End: 2022-03-23
Payer: MEDICAID

## 2022-03-23 ENCOUNTER — APPOINTMENT (OUTPATIENT)
Dept: MRI IMAGING | Facility: CLINIC | Age: 22
End: 2022-03-23
Payer: MEDICAID

## 2022-03-23 DIAGNOSIS — R29.898 OTHER SYMPTOMS AND SIGNS INVOLVING THE MUSCULOSKELETAL SYSTEM: ICD-10-CM

## 2022-03-23 PROCEDURE — 72141 MRI NECK SPINE W/O DYE: CPT

## 2022-03-23 PROCEDURE — 72141 MRI NECK SPINE W/O DYE: CPT | Mod: 26

## 2022-03-30 ENCOUNTER — APPOINTMENT (OUTPATIENT)
Dept: NEUROLOGY | Facility: CLINIC | Age: 22
End: 2022-03-30

## 2022-05-03 ENCOUNTER — APPOINTMENT (OUTPATIENT)
Dept: NEUROLOGY | Facility: CLINIC | Age: 22
End: 2022-05-03

## 2022-05-05 ENCOUNTER — APPOINTMENT (OUTPATIENT)
Dept: NEUROLOGY | Facility: AMBULATORY SURGERY CENTER | Age: 22
End: 2022-05-05

## 2022-10-26 ENCOUNTER — APPOINTMENT (OUTPATIENT)
Dept: MRI IMAGING | Facility: CLINIC | Age: 22
End: 2022-10-26

## 2022-11-30 ENCOUNTER — APPOINTMENT (OUTPATIENT)
Dept: NEUROLOGY | Facility: CLINIC | Age: 22
End: 2022-11-30

## 2023-02-02 ENCOUNTER — APPOINTMENT (OUTPATIENT)
Dept: NEUROLOGY | Facility: CLINIC | Age: 23
End: 2023-02-02